# Patient Record
Sex: MALE | URBAN - METROPOLITAN AREA
[De-identification: names, ages, dates, MRNs, and addresses within clinical notes are randomized per-mention and may not be internally consistent; named-entity substitution may affect disease eponyms.]

---

## 2020-07-08 ENCOUNTER — OFFICE VISIT (OUTPATIENT)
Dept: URBAN - METROPOLITAN AREA TELEHEALTH 2 | Facility: TELEHEALTH | Age: 75
End: 2020-07-08
Payer: MEDICARE

## 2020-07-08 DIAGNOSIS — K22.70 BARRETT ESOPHAGUS: ICD-10-CM

## 2020-07-08 DIAGNOSIS — C18.9 COLON CANCER: ICD-10-CM

## 2020-07-08 PROCEDURE — 99443 PHONE E/M BY PHYS 21-30 MIN: CPT | Performed by: INTERNAL MEDICINE

## 2020-07-08 NOTE — PHYSICAL EXAM GASTROINTESTINAL
Abdomen,  self Exam: Abdomen soft, non-tender to palpatation, non-distended, long surgical scar at the mid line andcolostomy bag on the Lt side.

## 2020-07-08 NOTE — PHYSICAL EXAM CONSTITUTIONAL:
in no acute distress,  well developed, well nourished,  ambulating without difficulty,  normal communication ability

## 2020-07-08 NOTE — HPI-TODAY'S VISIT:
Mr. German Leblanc is a 75-year-old  male who has history of rectal cancer underwent surgical intervention and left side colostomy done by Dr. Callahan in early in .  That followed up by chemotherapy given by Dr. Avelar.  He has not been followed up by oncologist or colorectal surgeon.  He is under care of Dr. Mahendra Lorenzo.,  The PCP.  He says that his last colonoscopy examination was in  and the records not available at this time for comment.  He flushes his colostomy every couple days and does not have any blood in the stool.  He denies abdominal pain nausea vomiting changes in his weight and appetite or energy level.  He says he is very active physically.  Family history positive for colon cancer in his younger brother who disease with a cancer in his 50s another brother had prostate cancer who  in his 40s He does have history of chronic GERD for which he takes PPI has Nexium at present time and his symptoms are completely checked.  Although he has chronic cough intermittently.  His last EGD was done more than 15 years ago and he says he had gastric polyp and possible Bernard's esophagus.  The records not available for comment at this time. He has hypertension and dyslipidemia for which he takes medications.  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

## 2021-03-19 ENCOUNTER — WEB ENCOUNTER (OUTPATIENT)
Dept: URBAN - METROPOLITAN AREA CLINIC 111 | Facility: CLINIC | Age: 76
End: 2021-03-19